# Patient Record
Sex: MALE | Race: BLACK OR AFRICAN AMERICAN | NOT HISPANIC OR LATINO | URBAN - METROPOLITAN AREA
[De-identification: names, ages, dates, MRNs, and addresses within clinical notes are randomized per-mention and may not be internally consistent; named-entity substitution may affect disease eponyms.]

---

## 2018-06-18 ENCOUNTER — EMERGENCY (EMERGENCY)
Facility: HOSPITAL | Age: 53
LOS: 0 days | Discharge: HOME | End: 2018-06-19
Attending: EMERGENCY MEDICINE
Payer: MEDICAID

## 2018-06-18 VITALS
RESPIRATION RATE: 18 BRPM | SYSTOLIC BLOOD PRESSURE: 142 MMHG | HEART RATE: 86 BPM | WEIGHT: 190.04 LBS | TEMPERATURE: 98 F | OXYGEN SATURATION: 100 % | DIASTOLIC BLOOD PRESSURE: 117 MMHG

## 2018-06-18 DIAGNOSIS — N20.0 CALCULUS OF KIDNEY: ICD-10-CM

## 2018-06-18 DIAGNOSIS — N48.30 PRIAPISM, UNSPECIFIED: ICD-10-CM

## 2018-06-18 PROCEDURE — 99285 EMERGENCY DEPT VISIT HI MDM: CPT | Mod: 25

## 2018-06-18 PROCEDURE — 54235 NJX CORPORA CAVERNOSA RX AGT: CPT

## 2018-06-18 RX ORDER — PHENYLEPHRINE HYDROCHLORIDE 10 MG/ML
0.1 INJECTION INTRAVENOUS ONCE
Qty: 0 | Refills: 0 | Status: COMPLETED | OUTPATIENT
Start: 2018-06-18 | End: 2018-06-18

## 2018-06-18 RX ADMIN — PHENYLEPHRINE HYDROCHLORIDE 0.1 MILLIGRAM(S): 10 INJECTION INTRAVENOUS at 23:04

## 2018-06-18 NOTE — ED PROVIDER NOTE - MEDICAL DECISION MAKING DETAILS
penis aspirated, still erect. dr bo to consult. Patient signed out to incoming physician.  All decisions regarding the progression of care will be made at their discretion.

## 2018-06-18 NOTE — ED ADULT NURSE NOTE - CHPI ED SYMPTOMS NEG
no dysuria/no hematuria/no diarrhea/no nausea/no blood in stool/no fever/no burning urination/no chills/no abdominal distension/no vomiting

## 2018-06-18 NOTE — ED ADULT NURSE NOTE - OBJECTIVE STATEMENT
patient reports he "woke up at 8pm with priapism." Patient has had previous episode: needle and drained 25 years ago.

## 2018-06-18 NOTE — ED PROVIDER NOTE - PHYSICAL EXAMINATION
Gen: Alert, Well appearing. NAD    Head: NC, AT, PERRL, EOMI, normal lids/conjunctiva   ENT: Bilateral TM WNL, normal hearing, patent oropharynx without erythema/exudate, uvula midline  Neck: supple, no tenderness/meningismus/JVD   Pulm: Bilateral clear BS, normal resp effort, no wheeze/stridor/retractions  CV: RRR, no M/R/G, +dist pulses   Abd: soft, NT/ND, +BS, no guarding/rebound tenderness  Mskel: no edema/erythema/cyanosis   Skin: no rash   Neuro: AAOx3, no sensory/motor deficits, CN 2-12 intact Gen: Alert, Well appearing. NAD    Head: NC, AT, PERRL, EOMI, normal lids/conjunctiva   ENT: Bilateral TM WNL, normal hearing, patent oropharynx without erythema/exudate, uvula midline  Neck: supple, no tenderness/meningismus/JVD   Pulm: Bilateral clear BS, normal resp effort, no wheeze/stridor/retractions  CV: RRR, no M/R/G, +dist pulses   Abd: soft, NT/ND, +BS, no guarding/rebound tenderness  : erect penis, curved to left (pt reports does curve to left occasionally, but not constantly). testicles nontender, no edema/erythema  Mskel: no edema/erythema/cyanosis   Skin: no rash   Neuro: AAOx3, no sensory/motor deficits, CN 2-12 intact

## 2018-06-18 NOTE — ED PROVIDER NOTE - PROGRESS NOTE DETAILS
d/w dr. bo, phenylephrine 10mg in 1ml, diluted in 10cc NS. Inject 1 CC to rt shaft stll no improvement, inject 1more CC into left shaft still no improvement, [facility] butterfly to aspirate blood, ~ ~150cc venous blood removed, and penis detumesced, however still slightly erected. pt requesting to stop. 1more cc phenylephrine injected. pt states wants to walk around as that will help dr bo aware will come consult. pt states feeling better with walking around. dr bo request penile ultrasound. Dr. Marc urologist is here eval pt. Pt sts he is feeling much better wants to leave and Dr. Marc urologist eval the pt and agrees pt can be discharged.

## 2018-06-18 NOTE — ED PROVIDER NOTE - OBJECTIVE STATEMENT
52yo male with no pertinent pmh except h/o priapism requiring aspiration 25 years ago, presents with priapism since 8am.     ROS: No fever/chills. No photophobia/eye pain/changes in vision, No ear pain/sore throat/dysphagia, No chest pain/palpitations. No SOB/cough/stridor. No abdominal pain, N/V/D, no black/bloody bm. No dysuria/frequency/discharge, No headache. No Dizziness.  No rash.  No numbness/tingling/weakness.

## 2018-06-19 VITALS
DIASTOLIC BLOOD PRESSURE: 84 MMHG | SYSTOLIC BLOOD PRESSURE: 153 MMHG | TEMPERATURE: 98 F | HEART RATE: 80 BPM | RESPIRATION RATE: 18 BRPM | OXYGEN SATURATION: 98 %

## 2018-06-19 LAB
ALBUMIN SERPL ELPH-MCNC: 3.5 G/DL — SIGNIFICANT CHANGE UP (ref 3.3–5)
ALP SERPL-CCNC: 65 U/L — SIGNIFICANT CHANGE UP (ref 40–120)
ALT FLD-CCNC: 23 U/L — SIGNIFICANT CHANGE UP (ref 12–78)
ANION GAP SERPL CALC-SCNC: 9 MMOL/L — SIGNIFICANT CHANGE UP (ref 5–17)
APTT BLD: 22.8 SEC — LOW (ref 27.5–37.4)
AST SERPL-CCNC: 23 U/L — SIGNIFICANT CHANGE UP (ref 15–37)
BASOPHILS # BLD AUTO: 0.03 K/UL — SIGNIFICANT CHANGE UP (ref 0–0.2)
BASOPHILS NFR BLD AUTO: 0.5 % — SIGNIFICANT CHANGE UP (ref 0–2)
BILIRUB SERPL-MCNC: 1.4 MG/DL — HIGH (ref 0.2–1.2)
BUN SERPL-MCNC: 18 MG/DL — SIGNIFICANT CHANGE UP (ref 7–23)
CALCIUM SERPL-MCNC: 8.8 MG/DL — SIGNIFICANT CHANGE UP (ref 8.5–10.1)
CHLORIDE SERPL-SCNC: 112 MMOL/L — HIGH (ref 96–108)
CO2 SERPL-SCNC: 23 MMOL/L — SIGNIFICANT CHANGE UP (ref 22–31)
CREAT SERPL-MCNC: 1.1 MG/DL — SIGNIFICANT CHANGE UP (ref 0.5–1.3)
EOSINOPHIL # BLD AUTO: 0.06 K/UL — SIGNIFICANT CHANGE UP (ref 0–0.5)
EOSINOPHIL NFR BLD AUTO: 0.9 % — SIGNIFICANT CHANGE UP (ref 0–6)
GLUCOSE SERPL-MCNC: 99 MG/DL — SIGNIFICANT CHANGE UP (ref 70–99)
HCT VFR BLD CALC: 36.7 % — LOW (ref 39–50)
HGB BLD-MCNC: 12.1 G/DL — LOW (ref 13–17)
IMM GRANULOCYTES NFR BLD AUTO: 0.2 % — SIGNIFICANT CHANGE UP (ref 0–1.5)
INR BLD: 1.12 RATIO — SIGNIFICANT CHANGE UP (ref 0.88–1.16)
LYMPHOCYTES # BLD AUTO: 1.85 K/UL — SIGNIFICANT CHANGE UP (ref 1–3.3)
LYMPHOCYTES # BLD AUTO: 27.9 % — SIGNIFICANT CHANGE UP (ref 13–44)
MCHC RBC-ENTMCNC: 28.8 PG — SIGNIFICANT CHANGE UP (ref 27–34)
MCHC RBC-ENTMCNC: 33 GM/DL — SIGNIFICANT CHANGE UP (ref 32–36)
MCV RBC AUTO: 87.4 FL — SIGNIFICANT CHANGE UP (ref 80–100)
MONOCYTES # BLD AUTO: 0.59 K/UL — SIGNIFICANT CHANGE UP (ref 0–0.9)
MONOCYTES NFR BLD AUTO: 8.9 % — SIGNIFICANT CHANGE UP (ref 2–14)
NEUTROPHILS # BLD AUTO: 4.09 K/UL — SIGNIFICANT CHANGE UP (ref 1.8–7.4)
NEUTROPHILS NFR BLD AUTO: 61.6 % — SIGNIFICANT CHANGE UP (ref 43–77)
NRBC # BLD: 0 /100 WBCS — SIGNIFICANT CHANGE UP (ref 0–0)
PLATELET # BLD AUTO: 184 K/UL — SIGNIFICANT CHANGE UP (ref 150–400)
POTASSIUM SERPL-MCNC: 4.1 MMOL/L — SIGNIFICANT CHANGE UP (ref 3.5–5.3)
POTASSIUM SERPL-SCNC: 4.1 MMOL/L — SIGNIFICANT CHANGE UP (ref 3.5–5.3)
PROT SERPL-MCNC: 6.8 GM/DL — SIGNIFICANT CHANGE UP (ref 6–8.3)
PROTHROM AB SERPL-ACNC: 12.2 SEC — SIGNIFICANT CHANGE UP (ref 9.8–12.7)
RBC # BLD: 4.2 M/UL — SIGNIFICANT CHANGE UP (ref 4.2–5.8)
RBC # FLD: 12.7 % — SIGNIFICANT CHANGE UP (ref 10.3–14.5)
SODIUM SERPL-SCNC: 144 MMOL/L — SIGNIFICANT CHANGE UP (ref 135–145)
WBC # BLD: 6.63 K/UL — SIGNIFICANT CHANGE UP (ref 3.8–10.5)
WBC # FLD AUTO: 6.63 K/UL — SIGNIFICANT CHANGE UP (ref 3.8–10.5)

## 2018-06-19 PROCEDURE — 76856 US EXAM PELVIC COMPLETE: CPT | Mod: 26

## 2018-06-19 RX ORDER — MORPHINE SULFATE 50 MG/1
4 CAPSULE, EXTENDED RELEASE ORAL ONCE
Qty: 0 | Refills: 0 | Status: DISCONTINUED | OUTPATIENT
Start: 2018-06-19 | End: 2018-06-19

## 2018-06-19 RX ADMIN — MORPHINE SULFATE 4 MILLIGRAM(S): 50 CAPSULE, EXTENDED RELEASE ORAL at 02:58

## 2018-06-19 RX ADMIN — MORPHINE SULFATE 4 MILLIGRAM(S): 50 CAPSULE, EXTENDED RELEASE ORAL at 03:00

## 2018-06-19 NOTE — ED PROCEDURE NOTE - GENERAL PROCEDURE DETAILS
area prepped with iodine, draped, butterfly placed and aspirated ~ 150cc blood from rt base of penis

## 2018-06-19 NOTE — ED ADULT NURSE REASSESSMENT NOTE - NS ED NURSE REASSESS COMMENT FT1
Ultrasound completed and consult done by Dr. Marc. Pt discharge and to follow up Ultrasound completed and consult done by Dr. Marc. Pt discharge and to follow up. Urinalysis to be cancel by Dr Garcia

## 2018-06-19 NOTE — CONSULT NOTE ADULT - ASSESSMENT
Priapism:  idiopathic.  as per pt it is much improved with aspiration and injection therapy.. He wants to go home and will return if gets worse.

## 2018-06-19 NOTE — ED PROCEDURE NOTE - CPROC ED TIME OUT STATEMENT1
“Patient's name, , procedure and correct site were confirmed during the [facility] Timeout.”
“Patient's name, , procedure and correct site were confirmed during the [facility] Timeout.”

## 2018-06-19 NOTE — CONSULT NOTE ADULT - SUBJECTIVE AND OBJECTIVE BOX
HPI:  54yo male with no pertinent pmh except h/o priapism requiring aspiration 25 years ago, presents with priapism since 8am.   Pt recd Phenylephrine injections and aspiration of Cavernous cavernosa by Dr Nieto. Pt still has priapism but much improved as per pt. Pain is less and softness has improved.        PAST MEDICAL & SURGICAL HISTORY:  Priapism  Kidney stones  No significant past surgical history      Allergies    No Known Allergies      FAMILY HISTORY:      Home Medications:                ROS:    General:  No wt loss, fevers, chills, night sweats  ENT:  No sore throat, pain, runny nose,   CV:  No pain, palpitatioins,  Resp:  No dyspnea, cough, tachypnea, wheezing  GI:  No pain, nausea, vomiting, diarrhea, constipatiion  : erection of penis not subsiding.  Neuro:  No weakness, tingling,   Endocrine:  No polyuria, polydypsia, cold/heat intolerance  Skin:  No rash,  edema      Physical Exam:    Vital Signs:  Vital Signs Last 24 Hrs  T(C): 36.8 (19 Jun 2018 09:21), Max: 37.3 (19 Jun 2018 07:38)  T(F): 98.2 (19 Jun 2018 09:21), Max: 99.1 (19 Jun 2018 07:38)  HR: 80 (19 Jun 2018 09:21) (80 - 86)  BP: 153/84 (19 Jun 2018 09:21) (115/74 - 153/84)  BP(mean): --  RR: 18 (19 Jun 2018 09:21) (17 - 18)  SpO2: 98% (19 Jun 2018 09:21) (94% - 100%)    General:  Appears stated age,  well-nourished, no distress  HEENT:  NC/AT, patent nares w/ pink mucosa, OP moist and pink,   conjunctivae clear  Chest:  Full & symmetric excursion, no increased effort.   Abdomen:  Soft, non-tender, non-distended, normoactive bowel sounds.  Genitalia: Circumcised Phallus, Adequate meatus, no hypospadias. Both testicles descended, non tender, no mass. No epididymitis or epididymal mass. No hydrocele. Persistent priaipism.  Rectal Examination: Deferred.  Extremities:  no edema, pedal pusation are present, no calf tenderness.  Skin:  No rash/erythema  Neuro/Psych:  Alert and conscious. Grossly intact and symmetrical.      LABS:                        12.1   6.63  )-----------( 184      ( 19 Jun 2018 06:53 )             36.7     06-19    144  |  112<H>  |  18  ----------------------------<  99  4.1   |  23  |  1.10    Ca    8.8      19 Jun 2018 06:53    TPro  6.8  /  Alb  3.5  /  TBili  1.4<H>  /  DBili  x   /  AST  23  /  ALT  23  /  AlkPhos  65  06-19    PT/INR - ( 19 Jun 2018 06:53 )   PT: 12.2 sec;   INR: 1.12 ratio         PTT - ( 19 Jun 2018 06:53 )  PTT:22.8 sec        RADIOLOGY & ADDITIONAL STUDIES:  < from: US Pelvis Complete (06.19.18 @ 08:37) >  EXAM:  US PELVIC COMPLETE                            PROCEDURE DATE:  06/19/2018          INTERPRETATION:  CLINICAL INFORMATION: Priapism     COMPARISON: None available.    TECHNIQUE:     Ultrasound of the penis was performed using a high-frequencylinear   transducer. Color and spectral Doppler was performed.    FINDINGS AND   IMPRESSION:     Small droplets of air are noted within the right corpus cavernosum,   compatible with recent penile injection. High resistance flow is seen   within the cavernosal arteries. The deep dorsal vein is diminutive.   Multiple superficial vessels are visualized and color Doppler.                  FELIPE HOPE M.D., ATTENDING RADIOLOGIST  This document has been electronically signed. Jun 19 2018  9:15AM

## 2019-01-06 ENCOUNTER — EMERGENCY (EMERGENCY)
Facility: HOSPITAL | Age: 54
LOS: 0 days | Discharge: HOME | End: 2019-01-06
Attending: EMERGENCY MEDICINE
Payer: MEDICAID

## 2019-01-06 VITALS
SYSTOLIC BLOOD PRESSURE: 146 MMHG | DIASTOLIC BLOOD PRESSURE: 92 MMHG | OXYGEN SATURATION: 97 % | RESPIRATION RATE: 19 BRPM | HEART RATE: 95 BPM | WEIGHT: 229.94 LBS | TEMPERATURE: 99 F | HEIGHT: 76 IN

## 2019-01-06 VITALS
SYSTOLIC BLOOD PRESSURE: 123 MMHG | HEART RATE: 100 BPM | OXYGEN SATURATION: 98 % | TEMPERATURE: 99 F | DIASTOLIC BLOOD PRESSURE: 86 MMHG | RESPIRATION RATE: 18 BRPM

## 2019-01-06 DIAGNOSIS — N20.0 CALCULUS OF KIDNEY: ICD-10-CM

## 2019-01-06 DIAGNOSIS — N48.30 PRIAPISM, UNSPECIFIED: ICD-10-CM

## 2019-01-06 PROCEDURE — 99283 EMERGENCY DEPT VISIT LOW MDM: CPT | Mod: 25

## 2019-01-06 PROCEDURE — 54235 NJX CORPORA CAVERNOSA RX AGT: CPT

## 2019-01-06 RX ORDER — OXYCODONE HYDROCHLORIDE 5 MG/1
1 TABLET ORAL
Qty: 12 | Refills: 0 | OUTPATIENT
Start: 2019-01-06 | End: 2019-01-08

## 2019-01-06 RX ORDER — CYCLOBENZAPRINE HYDROCHLORIDE 10 MG/1
1 TABLET, FILM COATED ORAL
Qty: 15 | Refills: 0 | OUTPATIENT
Start: 2019-01-06 | End: 2019-01-10

## 2019-01-06 RX ORDER — PHENYLEPHRINE HYDROCHLORIDE 10 MG/ML
1 INJECTION INTRAVENOUS ONCE
Qty: 0 | Refills: 0 | Status: COMPLETED | OUTPATIENT
Start: 2019-01-06 | End: 2019-01-06

## 2019-01-06 RX ADMIN — PHENYLEPHRINE HYDROCHLORIDE 1 MILLIGRAM(S): 10 INJECTION INTRAVENOUS at 06:42

## 2019-01-06 NOTE — ED PROVIDER NOTE - PROGRESS NOTE DETAILS
d/w dr. ortiz, will come evaluate pt, recommend phenylephrine and aspiration. d/w pt, does not want aspiration at this time but agree for phenylephrine. area prepped, aspirated venous blood, 100mcg of phenylephrine injected, for reassessment. pt does not want to dorsal penile block. pt tolerated procedure well.

## 2019-01-06 NOTE — ED PROVIDER NOTE - PHYSICAL EXAMINATION
Gen: Alert, Well appearing. NAD    Head: NC, AT, PERRL, EOMI, normal lids/conjunctiva   ENT: Bilateral TM WNL, normal hearing, patent oropharynx without erythema/exudate, uvula midline  Neck: supple, no tenderness/meningismus/JVD   Pulm: Bilateral clear BS, normal resp effort, no wheeze/stridor/retractions  CV: RRR, no M/R/G, +dist pulses   Abd: soft, NT/ND, +BS, no guarding/rebound tenderness  : + priapism, no ischemic symptoms.  Mskel: no edema/erythema/cyanosis   Skin: no rash   Neuro: AAOx3, no sensory/motor deficits, CN 2-12 intact

## 2019-01-06 NOTE — ED PROVIDER NOTE - MEDICAL DECISION MAKING DETAILS
phenylephrine injected. Dr ortiz to come consul.t. Patient signed out to incoming physician.  All decisions regarding the progression of care will be made at their discretion.

## 2019-01-06 NOTE — ED PROVIDER NOTE - OBJECTIVE STATEMENT
52yo male with no pertinent pmh except h/o priapism requiring aspiration 25 years ago, and 6 months ago, priapism x 20 hours. No significant pain. denies drugs.     ROS: No fever/chills. No photophobia/eye pain/changes in vision, No ear pain/sore throat/dysphagia, No chest pain/palpitations. No SOB/cough/stridor. No abdominal pain, N/V/D, no black/bloody bm. No dysuria/frequency/discharge, No headache. No Dizziness.  No rash.  No numbness/tingling/weakness.

## 2019-01-06 NOTE — ED PROCEDURE NOTE - GENERAL PROCEDURE DETAILS
area prepped, 25gauge needle inserted, + venous blood aspirated and 100mcg/1m of phenylephrine injected

## 2019-01-06 NOTE — CONSULT NOTE ADULT - PROBLEM SELECTOR RECOMMENDATION 9
No response to ICI. Penis aspirated using sterile technique and 1 lidocaine % penile block. One hundred and ten ml dark blood evacuated.  Detumescence achieved. Followup for evaluation. Return to ER for recurrence.

## 2019-01-06 NOTE — ED ADULT TRIAGE NOTE - CHIEF COMPLAINT QUOTE
Patient reports "Priapism." Patient last episode in June, had to be drained. No known triggers. This is third episode. Started yesterday afternoon

## 2019-01-06 NOTE — ED ADULT NURSE REASSESSMENT NOTE - NS ED NURSE REASSESS COMMENT FT1
pt received from night RN. pt seen by Dr. Evans. pt denies pain. pt A&Ox4. pt resting comfortably and is being monitored. plan of care explained to pt.

## 2019-01-06 NOTE — ED ADULT NURSE NOTE - OBJECTIVE STATEMENT
Pt is A&O X3, presents w/ priapism onset yesterday. Had 2 similar incidents one last year june and 27 years ago. Denies any use of illicit drugs, viagra or history  of sickle cell

## 2019-01-06 NOTE — CONSULT NOTE ADULT - SUBJECTIVE AND OBJECTIVE BOX
HPI: 53 year old [place] male with two prior h/o priapism requirig aspiration and phenylephrine inin 2000 and 2016. He developed a persistent erection twenty hours ago which did not resolve with "cold packs." His erection occurred without sexual activity/arousal.      PAST MEDICAL & SURGICAL HISTORY:  Priapism  Kidney stones  No significant past surgical history      REVIEW OF SYSTEMS:    General: no fever	    Respiratory and Thorax: no sob  	  Cardiovascular:	no cp    Gastrointestinal:	 no change in BM    Genitourinary:	no frequency/hesitancy    Musculoskeletal:	no bone pain      MEDICATIONS  (STANDING):    MEDICATIONS  (PRN):      Allergies    No Known Allergies    Intolerances        SOCIAL HISTORY: no tobacco;     FAMILY HISTORY: none      Vital Signs Last 24 Hrs  T(C): 37.2 (06 Jan 2019 11:36), Max: 37.2 (06 Jan 2019 08:11)  T(F): 99 (06 Jan 2019 11:36), Max: 99 (06 Jan 2019 08:11)  HR: 100 (06 Jan 2019 11:36) (90 - 100)  BP: 123/86 (06 Jan 2019 11:36) (123/86 - 158/92)  BP(mean): --  RR: 18 (06 Jan 2019 11:36) (18 - 19)  SpO2: 98% (06 Jan 2019 11:36) (97% - 98%)      PHYSICAL EXAM:    Constitutional: wdwn    Gastrointestinal: soft, ND, NT    Genitourinary: erect/severely engorged penis    Rectal: deferred    Extremities: no edema      LABS:                RADIOLOGY & ADDITIONAL STUDIES:

## 2019-06-10 ENCOUNTER — EMERGENCY (EMERGENCY)
Facility: HOSPITAL | Age: 54
LOS: 0 days | Discharge: HOME | End: 2019-06-10
Attending: EMERGENCY MEDICINE
Payer: MEDICAID

## 2019-06-10 VITALS
OXYGEN SATURATION: 96 % | HEIGHT: 76 IN | DIASTOLIC BLOOD PRESSURE: 108 MMHG | WEIGHT: 229.94 LBS | TEMPERATURE: 100 F | RESPIRATION RATE: 17 BRPM | HEART RATE: 110 BPM | SYSTOLIC BLOOD PRESSURE: 162 MMHG

## 2019-06-10 VITALS
SYSTOLIC BLOOD PRESSURE: 120 MMHG | RESPIRATION RATE: 18 BRPM | DIASTOLIC BLOOD PRESSURE: 70 MMHG | OXYGEN SATURATION: 99 % | TEMPERATURE: 99 F | HEART RATE: 88 BPM

## 2019-06-10 VITALS
WEIGHT: 225.09 LBS | OXYGEN SATURATION: 100 % | RESPIRATION RATE: 20 BRPM | HEART RATE: 119 BPM | TEMPERATURE: 98 F | DIASTOLIC BLOOD PRESSURE: 73 MMHG | SYSTOLIC BLOOD PRESSURE: 133 MMHG

## 2019-06-10 VITALS
DIASTOLIC BLOOD PRESSURE: 97 MMHG | HEART RATE: 107 BPM | RESPIRATION RATE: 16 BRPM | OXYGEN SATURATION: 98 % | SYSTOLIC BLOOD PRESSURE: 164 MMHG

## 2019-06-10 DIAGNOSIS — N48.30 PRIAPISM, UNSPECIFIED: ICD-10-CM

## 2019-06-10 DIAGNOSIS — Z87.442 PERSONAL HISTORY OF URINARY CALCULI: ICD-10-CM

## 2019-06-10 PROCEDURE — 54235 NJX CORPORA CAVERNOSA RX AGT: CPT

## 2019-06-10 PROCEDURE — 99282 EMERGENCY DEPT VISIT SF MDM: CPT

## 2019-06-10 PROCEDURE — 99284 EMERGENCY DEPT VISIT MOD MDM: CPT | Mod: 25

## 2019-06-10 RX ORDER — PHENYLEPHRINE HYDROCHLORIDE 10 MG/ML
1 INJECTION INTRAVENOUS ONCE
Refills: 0 | Status: COMPLETED | OUTPATIENT
Start: 2019-06-10 | End: 2019-06-10

## 2019-06-10 RX ADMIN — PHENYLEPHRINE HYDROCHLORIDE 1 MILLIGRAM(S): 10 INJECTION INTRAVENOUS at 03:39

## 2019-06-10 NOTE — CONSULT NOTE ADULT - SUBJECTIVE AND OBJECTIVE BOX
HPI:  painful erection treated with aspiration and Phenylephrine injection this morning, returned with recurrence.   Had similar episodes in the past    PAST MEDICAL & SURGICAL HISTORY:  Priapism  Kidney stones  No significant past surgical history      Allergies    No Known Allergies          MEDICATIONS  (STANDING):    MEDICATIONS  (PRN):      ROS:    General:  No wt loss, fevers, chills, night sweats  ENT:  No sore throat, pain, runny nose,   CV:  No pain, palpitatioins,  Resp:  No dyspnea, cough, tachypnea, wheezing  GI:  No pain, nausea, vomiting, diarrhea, constipatiion  :  painful persistent erection  Neuro:  No weakness, tingling,   Endocrine:  No polyuria, polydypsia, cold/heat intolerance  Skin:  No rash,  edema      Physical Exam:    Vital Signs:  Vital Signs Last 24 Hrs  T(C): 37.3 (10 En 2019 15:44), Max: 37.6 (10 En 2019 02:36)  T(F): 99.2 (10 En 2019 15:44), Max: 99.7 (10 En 2019 02:36)  HR: 96 (10 En 2019 15:44) (96 - 119)  BP: 110/67 (10 En 2019 15:44) (110/67 - 164/97)  BP(mean): --  RR: 19 (10 En 2019 15:44) (16 - 20)  SpO2: 99% (10 En 2019 15:44) (96% - 100%)  Daily     Daily   I&O's Summary      General:  Appears stated age,  well-nourished, no distress  HEENT:  NC/AT, patent nares w/ pink mucosa, OP moist and pink,   conjunctivae clear  Chest:  Full & symmetric excursion, no increased effort.   Abdomen:  Soft, non-tender, non-distended, normoactive bowel sounds.  Genitalia: Circumcised Phallus, Adequate meatus, no hypospadias. Both testicles descended, non tender, no mass. No epididymitis or epididymal mass. No hydrocele. Moderate priapism  Rectal Examination: Deferred.  Extremities:  no edema, pedal pusation are present, no calf tenderness.  Skin:  No rash/erythema  Neuro/Psych:  Alert and conscious. Grossly intact and symmetrical.

## 2019-06-10 NOTE — ED PROVIDER NOTE - OBJECTIVE STATEMENT
Pertinent PMH/PSH/FHx/SHx and Review of Systems contained within:  54 yo m with pmh of idiopathic priapism presents in ED c/o unprovoked priapism tonight since 11am. No aggravating or relieving factors, No fever/chills, No photophobia/eye pain/changes in vision, No ear pain/sore throat/dysphagia, No chest pain/palpitations, no SOB/cough/wheeze/stridor, No abdominal pain, No N/V/D, no dysuria/frequency/discharge, No neck/back pain, no rash, no changes in neurological status/function.

## 2019-06-10 NOTE — ED PROVIDER NOTE - CLINICAL SUMMARY MEDICAL DECISION MAKING FREE TEXT BOX
aspiration and phenylephrine infusion performed successfully. patient now in good condition. d/c with care instructions. f/up with pmd and urology tomorrow.

## 2019-06-10 NOTE — ED ADULT TRIAGE NOTE - CHIEF COMPLAINT QUOTE
pt states, " I was here last night for priapism, but its back again , and I am here to have it checked " took extra strength Tylenol @2646

## 2019-06-10 NOTE — ED ADULT NURSE NOTE - CHIEF COMPLAINT QUOTE
pt states, " I was here last night for priapism, but its back again , and I am here to have it checked " took extra strength Tylenol @4571

## 2019-06-10 NOTE — ED PROVIDER NOTE - OBJECTIVE STATEMENT
54 yo male with history of priapism x3 in past presents with mild erection since discharge this morning. Patient states that he received drainage this morning with improvement. Patient states that he did not wrap penis. Patient denies chest pain, sob, abdominal pain, fever, chills. Patient states that he believes this occurred after ingesting ginseng tea 40 hours ago

## 2019-06-10 NOTE — ED ADULT NURSE NOTE - OBJECTIVE STATEMENT
pt is A&Ox3, ambulatory, able to make needs known and presents with C/O priapism,, engorgement began @ about 12pm today with some relief from "exercise" technique PT performed yet PT's discomfort worsened. PT has HX of priapism in the past.

## 2019-06-10 NOTE — ED PROCEDURE NOTE - GENERAL PROCEDURE DETAILS
area prepped, 21gauge needle inserted, + venous blood aspirated and 100mcg/1m of phenylephrine injected

## 2019-06-10 NOTE — ED PROVIDER NOTE - RESPIRATORY, MLM
Problem: Patient Care Overview  Goal: Plan of Care Review  Outcome: Ongoing (interventions implemented as appropriate)  POC reviewed with pt at 0500. Pt verbalized understanding. Questions and concerns addressed. No acute events overnight. Pt progressing toward goals. Will continue to monitor. See flowsheets for full assessment and VS info        Breath sounds clear and equal bilaterally.

## 2019-06-10 NOTE — ED ADULT TRIAGE NOTE - CHIEF COMPLAINT QUOTE
Patient reports "Priapism." Started on and off since 11am, "it went away then came back. became severe 2 hours."  Priapism is idiopathetic type. Patient used exercise to attempt to reduce with some relief.

## 2019-06-10 NOTE — CONSULT NOTE ADULT - ASSESSMENT
recurrent idiopathic priapism    feeling better  priapism improved No pain    will discharge to be followed as outpatient

## 2019-06-18 ENCOUNTER — EMERGENCY (EMERGENCY)
Facility: HOSPITAL | Age: 54
LOS: 0 days | Discharge: HOME | End: 2019-06-18
Attending: EMERGENCY MEDICINE
Payer: MEDICAID

## 2019-06-18 VITALS
DIASTOLIC BLOOD PRESSURE: 94 MMHG | WEIGHT: 214.95 LBS | HEIGHT: 76 IN | SYSTOLIC BLOOD PRESSURE: 146 MMHG | HEART RATE: 76 BPM | OXYGEN SATURATION: 99 % | RESPIRATION RATE: 18 BRPM | TEMPERATURE: 98 F

## 2019-06-18 VITALS
HEART RATE: 78 BPM | DIASTOLIC BLOOD PRESSURE: 87 MMHG | RESPIRATION RATE: 20 BRPM | SYSTOLIC BLOOD PRESSURE: 129 MMHG | OXYGEN SATURATION: 100 %

## 2019-06-18 DIAGNOSIS — N48.89 OTHER SPECIFIED DISORDERS OF PENIS: ICD-10-CM

## 2019-06-18 DIAGNOSIS — Z87.442 PERSONAL HISTORY OF URINARY CALCULI: ICD-10-CM

## 2019-06-18 DIAGNOSIS — N48.30 PRIAPISM, UNSPECIFIED: ICD-10-CM

## 2019-06-18 PROCEDURE — 99282 EMERGENCY DEPT VISIT SF MDM: CPT

## 2019-06-18 NOTE — ED ADULT NURSE NOTE - CHIEF COMPLAINT QUOTE
Penile pain he has a history of Priapism. He was here last week for the same. He woke up with the pain. Pain only to the front of the penis. He denies taking any drugs He took Tylenol 1000mg at 9am

## 2019-06-18 NOTE — ED PROVIDER NOTE - PROGRESS NOTE DETAILS
Dr. Marc urologist is here eval pt now. Pt sts he has no more pain to this penis and the erection is spontaneously resolved. Dr. Marc eval pt and agree pt to be discharged.

## 2019-06-18 NOTE — ED PROVIDER NOTE - NONTENDER LOCATION
right upper quadrant/suprapubic/left lower quadrant/right lower quadrant/periumbilical/umbilical/left costovertebral angle/left upper quadrant/right costovertebral angle

## 2019-06-18 NOTE — ED ADULT TRIAGE NOTE - CHIEF COMPLAINT QUOTE
Penile pain he has a history of Priapism. He was here last week for the same. He woke up with the pain. He took Tylenol 1000mg at 9am Penile pain he has a history of Priapism. He was here last week for the same. He woke up with the pain. Pain only to the front of the penis. He denies taking any drugs He took Tylenol 1000mg at 9am

## 2019-06-18 NOTE — ED PROVIDER NOTE - OBJECTIVE STATEMENT
54 years old male walked in c/o pain to his penile shaft with erection since this morning. Pt sts he was here treated by Dr. Marc urologist one week ago for Priapism. Pt denies recent hx of trauma, taking any new medications, headache, dizziness, blurred visions, light sensitivities, focal/distal weakness or numbness, cough, sob, chest pain, nausea, vomiting, fever, chills, abd pain, dysuria, hematuria, penile discharge or irregular bowel movements.

## 2019-06-18 NOTE — ED PROVIDER NOTE - CONSTITUTIONAL, MLM
normal... Well appearing, well nourished, awake, alert, oriented to person, place, time/situation and in no apparent distress. Speaking in clear full sentences no nasal flaring no shoulders retractions not holding his head/chest/abdomen/back, appears comfortable sitting up in the stretcher in a bright light room.
